# Patient Record
Sex: FEMALE | HISPANIC OR LATINO | Employment: UNEMPLOYED | URBAN - METROPOLITAN AREA
[De-identification: names, ages, dates, MRNs, and addresses within clinical notes are randomized per-mention and may not be internally consistent; named-entity substitution may affect disease eponyms.]

---

## 2020-03-05 ENCOUNTER — HOSPITAL ENCOUNTER (EMERGENCY)
Facility: MEDICAL CENTER | Age: 30
End: 2020-03-05
Attending: EMERGENCY MEDICINE
Payer: COMMERCIAL

## 2020-03-05 VITALS
TEMPERATURE: 99 F | HEART RATE: 85 BPM | SYSTOLIC BLOOD PRESSURE: 123 MMHG | HEIGHT: 61 IN | DIASTOLIC BLOOD PRESSURE: 68 MMHG | BODY MASS INDEX: 28.64 KG/M2 | RESPIRATION RATE: 20 BRPM | OXYGEN SATURATION: 97 % | WEIGHT: 151.68 LBS

## 2020-03-05 DIAGNOSIS — N12 PYELONEPHRITIS: ICD-10-CM

## 2020-03-05 DIAGNOSIS — R10.9 FLANK PAIN: ICD-10-CM

## 2020-03-05 LAB
APPEARANCE UR: ABNORMAL
BACTERIA #/AREA URNS HPF: ABNORMAL /HPF
BILIRUB UR QL STRIP.AUTO: ABNORMAL
COLOR UR: ABNORMAL
EPI CELLS #/AREA URNS HPF: ABNORMAL /HPF
GLUCOSE UR STRIP.AUTO-MCNC: NEGATIVE MG/DL
HCG UR QL: NEGATIVE
HYALINE CASTS #/AREA URNS LPF: ABNORMAL /LPF
KETONES UR STRIP.AUTO-MCNC: ABNORMAL MG/DL
LEUKOCYTE ESTERASE UR QL STRIP.AUTO: ABNORMAL
MICRO URNS: ABNORMAL
MUCOUS THREADS #/AREA URNS HPF: ABNORMAL /HPF
NITRITE UR QL STRIP.AUTO: POSITIVE
PH UR STRIP.AUTO: 6.5 [PH] (ref 5–8)
PROT UR QL STRIP: 300 MG/DL
RBC # URNS HPF: >150 /HPF
RBC UR QL AUTO: ABNORMAL
SP GR UR STRIP.AUTO: 1.02
UROBILINOGEN UR STRIP.AUTO-MCNC: 1 MG/DL
WBC #/AREA URNS HPF: ABNORMAL /HPF

## 2020-03-05 PROCEDURE — 99284 EMERGENCY DEPT VISIT MOD MDM: CPT

## 2020-03-05 PROCEDURE — 81025 URINE PREGNANCY TEST: CPT

## 2020-03-05 PROCEDURE — 96372 THER/PROPH/DIAG INJ SC/IM: CPT

## 2020-03-05 PROCEDURE — 700102 HCHG RX REV CODE 250 W/ 637 OVERRIDE(OP): Performed by: EMERGENCY MEDICINE

## 2020-03-05 PROCEDURE — 87186 SC STD MICRODIL/AGAR DIL: CPT

## 2020-03-05 PROCEDURE — 81001 URINALYSIS AUTO W/SCOPE: CPT

## 2020-03-05 PROCEDURE — 700111 HCHG RX REV CODE 636 W/ 250 OVERRIDE (IP): Performed by: EMERGENCY MEDICINE

## 2020-03-05 PROCEDURE — 87077 CULTURE AEROBIC IDENTIFY: CPT

## 2020-03-05 PROCEDURE — A9270 NON-COVERED ITEM OR SERVICE: HCPCS | Performed by: EMERGENCY MEDICINE

## 2020-03-05 PROCEDURE — 87086 URINE CULTURE/COLONY COUNT: CPT

## 2020-03-05 RX ORDER — KETOROLAC TROMETHAMINE 30 MG/ML
15 INJECTION, SOLUTION INTRAMUSCULAR; INTRAVENOUS ONCE
Status: COMPLETED | OUTPATIENT
Start: 2020-03-05 | End: 2020-03-05

## 2020-03-05 RX ORDER — HYDROCODONE BITARTRATE AND ACETAMINOPHEN 5; 325 MG/1; MG/1
1 TABLET ORAL EVERY 6 HOURS PRN
Qty: 9 TAB | Refills: 0 | Status: SHIPPED | OUTPATIENT
Start: 2020-03-05 | End: 2020-03-08

## 2020-03-05 RX ORDER — SULFAMETHOXAZOLE AND TRIMETHOPRIM 800; 160 MG/1; MG/1
1 TABLET ORAL ONCE
Status: COMPLETED | OUTPATIENT
Start: 2020-03-05 | End: 2020-03-05

## 2020-03-05 RX ORDER — SULFAMETHOXAZOLE AND TRIMETHOPRIM 800; 160 MG/1; MG/1
1 TABLET ORAL 2 TIMES DAILY
Qty: 14 TAB | Refills: 0 | Status: SHIPPED | OUTPATIENT
Start: 2020-03-05 | End: 2020-03-12

## 2020-03-05 RX ORDER — ONDANSETRON 4 MG/1
4 TABLET, ORALLY DISINTEGRATING ORAL EVERY 6 HOURS PRN
Qty: 8 TAB | Refills: 0 | Status: SHIPPED | OUTPATIENT
Start: 2020-03-05

## 2020-03-05 RX ORDER — OXYCODONE HYDROCHLORIDE AND ACETAMINOPHEN 5; 325 MG/1; MG/1
1 TABLET ORAL ONCE
Status: COMPLETED | OUTPATIENT
Start: 2020-03-05 | End: 2020-03-05

## 2020-03-05 RX ADMIN — KETOROLAC TROMETHAMINE 15 MG: 30 INJECTION, SOLUTION INTRAMUSCULAR; INTRAVENOUS at 21:27

## 2020-03-05 RX ADMIN — OXYCODONE HYDROCHLORIDE AND ACETAMINOPHEN 1 TABLET: 5; 325 TABLET ORAL at 21:28

## 2020-03-05 RX ADMIN — SULFAMETHOXAZOLE AND TRIMETHOPRIM 1 TABLET: 800; 160 TABLET ORAL at 21:28

## 2020-03-05 NOTE — LETTER
3/10/2020               Janey Tipton  [address] [facility] [facility] [address]        Dear Janey (MR#[id number])    This letter is sent in regards to your, recent visit to the [facility] Emergency Department on 3/5/2020.  During the visit, tests were performed to assist the physician in a medical diagnosis.  A review of those tests requires that we notify you of the following:    Your urine culture was POSITIVE for a bacteria called Escherichia coli. The antibiotic prescribed for you (sulfamethoxazole-trimethoprim) should be active to treat this bacteria. IT IS IMPORTANT THAT YOU CONTINUE TAKING YOUR ANTIBIOTIC UNTIL IT IS FINISHED.      Please feel free to contact me at the number below if you have any questions or concerns. Thank you for your cooperation in the matter.    Sincerely,  ED Culture Follow-Up Staff  Ursula Rico, PharmD, PharmD    [facility], Emergency Department  [address] [place] [id number]  [phone] (ED Culture Line)  [phone]

## 2020-03-06 NOTE — ED NOTES
"Agree with triage assessment, no changes noted. Pt reports \"I have pain when I pee and my flank hurts.\" Pt reports pain of 9 out of 10. Pt hooked to monitor. Pt denies any further needs at this time. Call light within reach. Bed in low locked position. Comfort measures provided.     "

## 2020-03-06 NOTE — ED PROVIDER NOTES
"ED Provider Note    CHIEF COMPLAINT  Chief Complaint   Patient presents with   • Flank Pain     since saturday   • Painful Urination     since saturday       HPI  Janey Tipton is a 29 y.o. female who presents to the emergency department with chief complaint of painful urination for the last 4 to 5 days including right-sided flank discomfort.  Nausea but no vomiting tactile fevers at home with chills.  She denies any vaginal discharge or bleeding she denies any constipation or diarrhea.  She states she does have blood in her urine is been very painful to urinate.  She has feels generally fatigued has not taken anything for pain at home.    REVIEW OF SYSTEMS  Positives as above. Pertinent negatives include vomiting vaginal discharge vaginal bleeding constipation diarrhea rash cough shortness of breath  All other review of systems are negative    PAST MEDICAL HISTORY       SOCIAL HISTORY  Social History     Tobacco Use   • Smoking status: Former Smoker     Years: 1.00     Types: Cigarettes   • Tobacco comment: quit 2008 smoked 1 cigarette a day   Substance and Sexual Activity   • Alcohol use: Yes     Comment: quit 2008 drank on weekwends   • Drug use: No   • Sexual activity: Yes     Partners: Male       SURGICAL HISTORY   has a past surgical history that includes primary c section (8/21/2010).    CURRENT MEDICATIONS  Home Medications    **Home medications have not yet been reviewed for this encounter**         ALLERGIES  No Known Allergies    PHYSICAL EXAM  VITAL SIGNS: /100   Pulse 80   Temp 37.2 °C (99 °F) (Oral)   Resp 20   Ht 1.549 m (5' 1\")   Wt 68.8 kg (151 lb 10.8 oz)   LMP 02/18/2020 (Approximate)   SpO2 98%   BMI 28.66 kg/m²    Pulse ox interpretation: I interpret this pulse ox as normal.  Constitutional: Alert appears mildly uncomfortable  HENT: Normocephalic, Atraumatic, MMM  Eyes: PERound. Conjunctiva normal, non-icteric.   Heart: Regular rate and rhythm, no murmurs.  " "  Lungs: Clear to auscultation bilaterally. No resp distress, breath sounds equal  Abdomen: Suprapubic tenderness with right CVA tenderness, non-distended, normal bowel sounds  Skin: Warm, Dry, No erythema, No rash.   Neurologic: Alert and oriented, Grossly non-focal.       DIFFERENTIAL DIAGNOSIS AND WORK UP PLAN    This is a 29 y.o. female who presents with physical exam and history concerning for pyelonephritis, does have some suprapubic and right CVA tenderness she is not tachycardic she does not appear septic or dehydrated she has not had any episodes of emesis.  She will be treated with oral pain management as well as some IM Toradol urinalysis urine pregnancy test and then reassess    Pertinent Lab Findings  Urinalysis consistent with infection with hematuria and not pregnant    Radiology  No orders to display     The radiologist's interpretation of all radiological studies have been reviewed by me.    COURSE & MEDICAL DECISION MAKING  Pertinent Labs & Imaging studies reviewed. (See chart for details)    9:40 PM  I reassessed patient the bedside she feeling little better after the pain management, we discussed her diagnosis of pyelonephritis she is does not appear septic at this time.  She will be sent home with oral pain management antiemetics and antibiotics and return precautions for any new or worsening severe pain fevers vomiting uncontrolled at home with oral antibiotics.  She understands feels comfortable with the plan    /68   Pulse 85   Temp 37.2 °C (99 °F) (Oral)   Resp 20   Ht 1.549 m (5' 1\")   Wt 68.8 kg (151 lb 10.8 oz)   LMP 02/18/2020 (Approximate)   SpO2 97%   BMI 28.66 kg/m²     In prescribing controlled substances to this patient, I certify that I have obtained and reviewed the medical history of Janey Aditya Worthington. I have also made a good carrington effort to obtain applicable records from other providers who have treated the patient and records did not demonstrate " any increased risk of substance abuse that would prevent me from prescribing controlled substances.     I have conducted a physical exam and documented it. I have reviewed Ms. Bora Worthington’s prescription history as maintained by the [place] Prescription Monitoring Program.     I have assessed the patient’s risk for abuse, dependency, and addiction using the validated Opioid Risk Tool available at https://www.mdcalc.com/xijrpq-pxpp-zkvt-ort-narcotic-abuse. 1  Given the above, I believe the benefits of controlled substance therapy outweigh the risks. The reasons for prescribing controlled substances include non-narcotic, oral analgesic alternatives have been inadequate for pain control. Accordingly, I have discussed the risk and benefits, treatment plan, and alternative therapies with the patient.       The patient will return for new or worsening symptoms and is stable at the time of discharge.    The patient is referred to a primary physician for blood pressure management, diabetic screening, and for all other preventative health concerns.    DISPOSITION:  Patient will be discharged home in stable condition.    FOLLOW UP:  [facility], Emergency Dept  [address] [id number] [phone]  In 2 days  If symptoms worsen      OUTPATIENT MEDICATIONS:  Discharge Medication List as of 3/5/2020  9:42 PM      START taking these medications    Details   sulfamethoxazole-trimethoprim (BACTRIM DS) 800-160 MG tablet Take 1 Tab by mouth 2 times a day for 7 days., Disp-14 Tab, R-0, Print Rx Paper      HYDROcodone-acetaminophen (NORCO) 5-325 MG Tab per tablet Take 1 Tab by mouth every 6 hours as needed for up to 3 days., Disp-9 Tab, R-0, Print Rx Paper      ondansetron (ZOFRAN ODT) 4 MG TABLET DISPERSIBLE Take 1 Tab by mouth every 6 hours as needed., Disp-8 Tab, R-0, Print Rx Paper               FINAL IMPRESSION  1. Pyelonephritis     2. Flank pain  HYDROcodone-acetaminophen (NORCO) 5-325  MG Tab per tablet              Electronically signed by: Abby Agee M.D., 3/5/2020 9:07 PM    This dictation has been created using voice recognition software and/or scribes. The accuracy of the dictation is limited by the abilities of the software and the expertise of the scribes. I expect there may be some errors of grammar and possibly content. I made every attempt to manually correct the errors within my dictation. However, errors related to voice recognition software and/or scribes may still exist and should be interpreted within the appropriate context.

## 2020-03-06 NOTE — ED NOTES
Education provided to on discharge instructions, follow up instructions, and medication instructions. Pt verbalized understanding. Pt dressed and walked out in stable condition.     Pt home with spouse.    Narcotics agreement signed. Pt sent with scripts

## 2020-03-06 NOTE — ED TRIAGE NOTES
Chief Complaint   Patient presents with   • Flank Pain     since saturday   • Painful Urination     since saturday     Pt ambulatory to triage with steady gait. Pt reports flank pain that radiates to RLQ and dysuria since Saturday but states it got worse today. Pt reports being diagnosed with UTI 2 days ago and has been taking an antibiotic. Pt appears uncomfortable in triage.

## 2020-03-08 LAB
BACTERIA UR CULT: ABNORMAL
BACTERIA UR CULT: ABNORMAL
SIGNIFICANT IND 70042: ABNORMAL
SITE SITE: ABNORMAL
SOURCE SOURCE: ABNORMAL

## 2020-03-10 NOTE — ED NOTES
"ED Positive Culture Follow-up/Notification Note:    Date: 3/10/2020     Patient seen in the ED on 3/5/2020 for flank pain, dysuria, hematuria. R CVAT on exam.   1. Pyelonephritis    2. Flank pain       Discharge Medication List as of 3/5/2020  9:42 PM      START taking these medications    Details   sulfamethoxazole-trimethoprim (BACTRIM DS) 800-160 MG tablet Take 1 Tab by mouth 2 times a day for 7 days., Disp-14 Tab, R-0, Print Rx Paper      HYDROcodone-acetaminophen (NORCO) 5-325 MG Tab per tablet Take 1 Tab by mouth every 6 hours as needed for up to 3 days., Disp-9 Tab, R-0, Print Rx Paper      ondansetron (ZOFRAN ODT) 4 MG TABLET DISPERSIBLE Take 1 Tab by mouth every 6 hours as needed., Disp-8 Tab, R-0, Print Rx Paper             Allergies: Patient has no known allergies.     Vitals:    03/05/20 1957 03/05/20 2029 03/05/20 2112 03/05/20 2130   BP: 148/100  114/78 123/68   Pulse: 80  91 85   Resp: 20      Temp: 37.2 °C (99 °F)      TempSrc: Oral      SpO2: 98%  97% 97%   Weight:  68.8 kg (151 lb 10.8 oz)     Height: 1.549 m (5' 1\")          Final cultures:   Results     Procedure Component Value Units Date/Time    URINE CULTURE(NEW) [[id number]]  (Abnormal)  (Susceptibility) Collected:  03/05/20 2041    Order Status:  Completed Specimen:  Urine Updated:  03/08/20 0808     Significant Indicator POS     Source UR     Site -     Culture Result -      Proteus mirabilis  >100,000 cfu/mL      Susceptibility     Proteus mirabilis (1)     Antibiotic Interpretation Microscan Method Status    Ampicillin Resistant >16 mcg/mL ALEX Final    Ceftriaxone Sensitive <=1 mcg/mL ALEX Final    Ceftazidime Sensitive <=1 mcg/mL ALEX Final    Cefotaxime Sensitive <=2 mcg/mL ALEX Final    Cefazolin Resistant >16 mcg/mL ALEX Final    Ciprofloxacin Sensitive <=1 mcg/mL ALEX Final    Ampicillin/sulbactam Sensitive <=8/4 mcg/mL ALEX Final    Cefepime Sensitive <=2 mcg/mL ALEX Final    Tobramycin Sensitive <=4 mcg/mL ALEX Final    Cefotetan Sensitive " <=16 mcg/mL ALEX Final    Nitrofurantoin Resistant >64 mcg/mL ALEX Final    Gentamicin Sensitive <=4 mcg/mL ALEX Final    Levofloxacin Sensitive <=2 mcg/mL ALEX Final    Pip/Tazobactam Sensitive <=16 mcg/mL ALEX Final    Trimeth/Sulfa Sensitive <=2/38 mcg/mL ALEX Final                   URINALYSIS CULTURE, IF INDICATED [[id number]]  (Abnormal) Collected:  03/05/20 2041    Order Status:  Completed Specimen:  Urine Updated:  03/05/20 2112     Color DK Yellow     Character Turbid     Specific Gravity 1.023     Ph 6.5     Glucose Negative mg/dL      Ketones Trace mg/dL      Protein 300 mg/dL      Bilirubin Small     Urobilinogen, Urine 1.0     Nitrite Positive     Leukocyte Esterase Moderate     Occult Blood Moderate     Micro Urine Req Microscopic          Plan:   Isolated Proteus sensitive to prescribed Bactrim. Sent letter informing of result and encouraging compliance.     Ursula Rico, PharmD